# Patient Record
Sex: FEMALE | Race: BLACK OR AFRICAN AMERICAN | URBAN - METROPOLITAN AREA
[De-identification: names, ages, dates, MRNs, and addresses within clinical notes are randomized per-mention and may not be internally consistent; named-entity substitution may affect disease eponyms.]

---
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
## 2024-04-26 ENCOUNTER — HOSPITAL ENCOUNTER (EMERGENCY)
Facility: HOSPITAL | Age: 19
Discharge: HOME | End: 2024-04-26
Attending: EMERGENCY MEDICINE

## 2024-04-26 VITALS
RESPIRATION RATE: 16 BRPM | OXYGEN SATURATION: 100 % | HEART RATE: 68 BPM | BODY MASS INDEX: 28.68 KG/M2 | DIASTOLIC BLOOD PRESSURE: 87 MMHG | WEIGHT: 168 LBS | HEIGHT: 64 IN | TEMPERATURE: 98.4 F | SYSTOLIC BLOOD PRESSURE: 126 MMHG

## 2024-04-26 DIAGNOSIS — R03.0 ELEVATED BLOOD PRESSURE READING WITHOUT DIAGNOSIS OF HYPERTENSION: ICD-10-CM

## 2024-04-26 DIAGNOSIS — G51.0 BELL'S PALSY: ICD-10-CM

## 2024-04-26 DIAGNOSIS — J45.21 MILD INTERMITTENT ASTHMA WITH ACUTE EXACERBATION: Primary | ICD-10-CM

## 2024-04-26 DIAGNOSIS — R06.2 WHEEZING: ICD-10-CM

## 2024-04-26 PROCEDURE — 99283 EMERGENCY DEPT VISIT LOW MDM: CPT

## 2024-04-26 RX ORDER — ALBUTEROL SULFATE 90 UG/1
2 AEROSOL, METERED RESPIRATORY (INHALATION) EVERY 6 HOURS PRN
Qty: 18 G | Refills: 3 | Status: SHIPPED | OUTPATIENT
Start: 2024-04-26 | End: 2024-04-26

## 2024-04-26 RX ORDER — ALBUTEROL SULFATE 90 UG/1
2 AEROSOL, METERED RESPIRATORY (INHALATION) EVERY 4 HOURS PRN
Qty: 18 G | Refills: 3 | Status: SHIPPED | OUTPATIENT
Start: 2024-04-26

## 2024-04-26 ASSESSMENT — PAIN SCALES - GENERAL: PAINLEVEL_OUTOF10: 0

## 2024-04-26 ASSESSMENT — PAIN - FUNCTIONAL ASSESSMENT: PAIN_FUNCTIONAL_ASSESSMENT: 0-10

## 2024-04-26 NOTE — ED TRIAGE NOTES
PT. STATES EPISODE OF WHEEZING AND CHEST TIGHTNESS AT WORK TODAY.  HX ASTHMA.  ALSO C/O NUMBNESS AND TINGLING LEFT SIDE OF FACE AROUND LEFT EYE.  PT. STATES FELT LIKE FACE WAS STUCK, DROOP LEFT SIDE.  MILD DROOP NOTED AROUND LEFT EYE.

## 2024-04-26 NOTE — ED PROVIDER NOTES
not ill-appearing or toxic-appearing.   Eyes:      Comments: Inability to raise left eyebrow  Mild lid lag left eye  Normal smile   Cardiovascular:      Rate and Rhythm: Normal rate.   Pulmonary:      Effort: Pulmonary effort is normal.      Comments: No focal adventitious findings on pul exam  Musculoskeletal:         General: Normal range of motion.   Skin:     General: Skin is warm.   Neurological:      Mental Status: She is alert.      Comments: Mild left lid lag  Cannot raise left eyebrow  Normal smile           SCREENINGS                  LAB, EKG AND DIAGNOSTIC RESULTS   Labs:  No results found for this or any previous visit (from the past 12 hour(s)).    EKG: Not Applicable    Radiologic Studies:  Non-plain film images such as CT, Ultrasound and MRI are read by the radiologist. Plain radiographic images are visualized and preliminarily interpreted by the ED Physician with the following findings: Not Applicable.    Interpretation per the Radiologist below, if available at the time of this note:  No orders to display        ED COURSE and DIFFERENTIAL DIAGNOSIS/MDM   2:44 PM Differential and Considerations: Pt has presentation consistent with Bell's Palsy- forehead not spared making CVA or TIA less likely and [place] more consistent. Symptoms subtle. Asthma, asthma exacerbation with mild symptoms. Unclear what her BP reading at work was as she was not told. Here normotensive. Will observe thirty min and re-evaluate. Pt does not feel she needs a breathing treatment now but does state she needs an inhaler. Unclear sequence of events whether the episode of bells palsy scared her as she was concerned causing BP elevation but pt is essence near baseline at this time.     Records Reviewed (source and summary of external notes): Prior medical records and Nursing notes    Vitals:    Vitals:    04/26/24 1335   BP: 124/81   Pulse: 80   Resp: 16   Temp: 98.2 °F (36.8 °C)   TempSrc: Oral   SpO2: 100%   Weight: 76.2 kg (168